# Patient Record
Sex: MALE | Race: WHITE
[De-identification: names, ages, dates, MRNs, and addresses within clinical notes are randomized per-mention and may not be internally consistent; named-entity substitution may affect disease eponyms.]

---

## 2018-06-22 NOTE — ED PHYSICIAN DOCUMENTATION
General Adult





- HISTORIAN


Historian: patient





- HPI


Stated Complaint: rash


Chief Complaint: General Adult


Additional Information: 





Rash on tip of penis x two weeks. Thought it was poison ivy but hasn't gone 

away. Sweats a lot at work. Never had anything like thsi before. No treatment 

attempted. No other modifying factors ID'ed and no other associated signs. 





- ROS


CONST: no problems





- PAST HX


Past History: none


Surgeries/Procedures: other (hand surgery at 13 y/o)





- SOCIAL HX


Smoking History: non-smoker





- FAMILY HX


Family History: No





- REVIEWED ASSESSMENTS


Nursing Assessment  Reviewed: Yes


Vitals Reviewed: Yes





General Adult Physical Exam





- PHYSICAL EXAM


GENERAL APPEARANCE: mild distress (worried)


EENT: eye inspection normal, ENT inspection normal


NECK: normal inspection, supple


RESPIRATORY: no resp distress


BACK: other (supple movements w/o pain)


SKIN: warm/dry, normal color, other (tip of penis andcorona with papules from 1-

3 mm diameter. No vesicles.)


EXTREMITIES: normal range of motion (gait and stance)


NEURO: CN's nml as tested, motor nml, sensation nml, cognition normal





Discharge


Clincal Impression: 


 Rash of genitalia





Additional Instructions: 


You can apply a small amount of over the counter hydrocortisone cream to the 

rash twice a day for no mre than a week. The other possible options are an 

antifungal cream or an antibiotic cream. Do not try all three at once If those 

options don't help or if you get worse, see the dermatologist. 


Condition: Good


Decision to Admit: NO


Decision Time: 00:45

## 2018-08-04 ENCOUNTER — HOSPITAL ENCOUNTER (EMERGENCY)
Dept: HOSPITAL 44 - ED | Age: 23
Discharge: HOME | End: 2018-08-04
Payer: SELF-PAY

## 2018-08-04 DIAGNOSIS — J01.90: Primary | ICD-10-CM

## 2018-08-04 DIAGNOSIS — B86: ICD-10-CM

## 2018-08-04 LAB
BASOPHILS NFR BLD: 0.8 % (ref 0–1.5)
EGFR (AFRICAN): > 60
EGFR (NON-AFRICAN): > 60
EOSINOPHIL NFR BLD: 1.6 % (ref 0–6.8)
MCH RBC QN AUTO: 29.5 PG (ref 28–34)
MCV RBC AUTO: 84.1 FL (ref 80–100)
MONOCYTES %: 4.8 % (ref 0–11)
NEUTROPHILS #: 3.5 # K/UL (ref 1.4–7.7)

## 2018-08-04 PROCEDURE — 96365 THER/PROPH/DIAG IV INF INIT: CPT

## 2018-08-04 PROCEDURE — S1016 NON-PVC INTRAVENOUS ADMINIST: HCPCS

## 2018-08-04 PROCEDURE — 99284 EMERGENCY DEPT VISIT MOD MDM: CPT

## 2018-08-04 PROCEDURE — 87491 CHLMYD TRACH DNA AMP PROBE: CPT

## 2018-08-04 PROCEDURE — 81002 URINALYSIS NONAUTO W/O SCOPE: CPT

## 2018-08-04 PROCEDURE — 85025 COMPLETE CBC W/AUTO DIFF WBC: CPT

## 2018-08-04 PROCEDURE — 87880 STREP A ASSAY W/OPTIC: CPT

## 2018-08-04 PROCEDURE — 87591 N.GONORRHOEAE DNA AMP PROB: CPT

## 2018-08-04 PROCEDURE — 96366 THER/PROPH/DIAG IV INF ADDON: CPT

## 2018-08-04 PROCEDURE — 87070 CULTURE OTHR SPECIMN AEROBIC: CPT

## 2018-08-04 PROCEDURE — 80053 COMPREHEN METABOLIC PANEL: CPT

## 2018-08-04 PROCEDURE — 71046 X-RAY EXAM CHEST 2 VIEWS: CPT

## 2018-08-04 NOTE — ED PHYSICIAN DOCUMENTATION
General Adult





- HISTORIAN


Historian: patient





- HPI


Stated Complaint: fever


Chief Complaint: General Adult


Additional Information: 





Fever to 104 for 3-4 days. Has rash on chest and back present for 3-4 days. 

Rash on leges/thighs present for 4 weeks that does not bother him (I saw this 

rash when he accompanied a friend to this ER 4 weeks ago. Rash is now on left 

thigh and more sparsely on lower legs).  He denies sinus congestion. Has 

occasional dry cough that occurs when he lies flat. Last ibuprofen or tylenol 

24 hours ago. Says tongue get patchy when he has a fever. No other modifying 

factors or associated signs.  





- ROS


CONST: fever


CVS/RESP: cough





- PAST HX


Past History: none


Allergies/Adverse Reactions: 


 Allergies











Allergy/AdvReac Type Severity Reaction Status Date / Time


 


amoxicillin Allergy  Itchy Skin Verified 08/04/18 18:29














Home Medications: 


 Ambulatory Orders











 Medication  Instructions  Recorded


 


Azithromycin [Zithromax] 250 mg PO DAILY #4 tablet 08/04/18


 


Permethrin [Elimite] 60 gm TP 1T #1 cream..g. 08/04/18














- SOCIAL HX


Smoking History: non-smoker





- FAMILY HX


Family History: No





- VITAL SIGNS


Vital Signs: 





 Vital Signs











Temp Pulse Resp BP Pulse Ox


 


          129/85    


 


          06/22/18 00:34   














- REVIEWED ASSESSMENTS


Nursing Assessment  Reviewed: Yes


Vitals Reviewed: Yes





Progress





- Progress


Progress: 





Rapid strep negative, but rash suspicious, on thorax at least. Girlfriend's 

rash went away with Elimitie. 





ED Results Lab/Radiology





- Orders


Orders: 





 ED Orders











 Category Date Time Status


 


 Place IV Lock 1T Care  08/04/18 18:02 Active


 


 CBC/PLATELET/DIFF Routine Lab  08/04/18 Ordered


 


 CHLAMYDIA & GONORRHOEAE Stat Lab  08/04/18 Ordered


 


 CMP Routine Lab  08/04/18 Ordered


 


 GRP A STREP SCREEN Stat Lab  08/04/18 Ordered


 


 URINALYSIS Routine Lab  08/04/18 Ordered


 


 NORMAL SALINE @ 1000 MLS/HR ( 1000ml BOLUS) Med  08/04/18 18:08 Ordered





 0.9 % Sodium Chloride [Normal Saline] 1,000 ml   





 IV Q1H   














General Adult Physical Exam





- PHYSICAL EXAM


GENERAL APPEARANCE: mild distress


EENT: eye inspection normal, ENT inspection normal (except tongue with two 

areas on left side with decreased papillae), pharynx normal, other (Pain with 

palpation all facial sinus areas).  No: pharyngeal erythema


NECK: normal inspection, supple


RESPIRATORY: no resp distress, breath sounds normal


CVS: reg rate & rhythm, heart sounds normal, no murmur, tachycardia (120's)


ABDOMEN: soft, normal bowel sounds


BACK: normal inspection, other (no vertebral tenderness)


SKIN: warm/dry, other (has profuse sandpapery fine rash on chest and back. Rash 

on legs is maculopapular, scattered, pink, 3-4 mm diameter. )


EXTREMITIES: no evidence of injury, no edema


NEURO: CN's nml as tested, motor nml, sensation nml, cognition normal





Discharge


Clincal Impression: 


 Scabies





Sinusitis


Qualifiers:


 Sinusitis location: unspecified location Chronicity: acute Recurrence: not 

specified as recurrent Qualified Code(s): J01.90 - Acute sinusitis, unspecified





Prescriptions: 


Azithromycin [Zithromax] 250 mg PO DAILY #4 tablet


Permethrin [Elimite] 60 gm TP 1T #1 cream..g.


Referrals: 


Primary Doctor,No [Primary Care Provider] - 2 Days


Additional Instructions: 


Apply the cream to your entire body and thoroughly massage it in. Shower to 

remove the cream after 8-14 hours. For the sinusitis, your antibiotic 

prescription is at [facility]'s also. Take all the antibiotics as prescribed until 

they are completely gone. Treay any fever of 101 or higher with ibuprofen or 

tylenol. 


Condition: Good


Disposition: 01 HOME, SELF-CARE


Decision to Admit: NO


Decision Time: 19:35

## 2018-08-05 VITALS — SYSTOLIC BLOOD PRESSURE: 115 MMHG | DIASTOLIC BLOOD PRESSURE: 72 MMHG

## 2018-08-05 LAB
APPEARANCE UR: CLEAR
COLOR,URINE: YELLOW
PH UR STRIP: 5.5 [PH] (ref 5–8)
RBC UR QL: (no result)
UROBILINOGEN URINE: 1 EU (ref 0.2–1)

## 2018-08-05 NOTE — DIAGNOSTIC IMAGING REPORT
[facility]

[address] [facility].[address] [address]

 

 

 

 

Report Submission Date: Aug 4, 2018 7:23:02 PM CDT

Patient       Study

Name:   NEPTALI PARK       Date:   Aug 4, 2018 6:46:12 PM CDT

MRN:   [id number]       Modality Type:   DX

Gender:   M       Description:   CHEST

:   [birth year]       Institution:   [facility]

Physician:   ELIN BUSTILLO

     Accession:    [id number]

 

 

Chest two views 



History:  Cough and fever for 4 days 



Findings:  The lungs are clear.  There is no pleural effusion.  Heart size and 
pulmonary vascularity are normal.  Osseous structures are unremarkable. 



Impression:  Normal chest.

 

Electronically signed on Aug 4, 2018 7:23:02 PM CDT by:

oJnn ROBIN